# Patient Record
Sex: FEMALE | Race: WHITE | URBAN - METROPOLITAN AREA
[De-identification: names, ages, dates, MRNs, and addresses within clinical notes are randomized per-mention and may not be internally consistent; named-entity substitution may affect disease eponyms.]

---

## 2017-05-11 ENCOUNTER — OFFICE VISIT (OUTPATIENT)
Dept: FAMILY MEDICINE CLINIC | Age: 22
End: 2017-05-11

## 2017-05-11 VITALS
TEMPERATURE: 97.8 F | RESPIRATION RATE: 20 BRPM | HEART RATE: 75 BPM | HEIGHT: 63 IN | OXYGEN SATURATION: 98 % | WEIGHT: 254.4 LBS | SYSTOLIC BLOOD PRESSURE: 132 MMHG | BODY MASS INDEX: 45.07 KG/M2 | DIASTOLIC BLOOD PRESSURE: 78 MMHG

## 2017-05-11 DIAGNOSIS — M25.572 ACUTE LEFT ANKLE PAIN: Primary | ICD-10-CM

## 2017-05-11 PROCEDURE — 99202 OFFICE O/P NEW SF 15 MIN: CPT | Performed by: NURSE PRACTITIONER

## 2017-05-11 PROCEDURE — G8417 CALC BMI ABV UP PARAM F/U: HCPCS | Performed by: NURSE PRACTITIONER

## 2017-05-11 PROCEDURE — 1036F TOBACCO NON-USER: CPT | Performed by: NURSE PRACTITIONER

## 2017-05-11 PROCEDURE — G8427 DOCREV CUR MEDS BY ELIG CLIN: HCPCS | Performed by: NURSE PRACTITIONER

## 2017-05-11 RX ORDER — NAPROXEN 500 MG/1
500 TABLET ORAL 2 TIMES DAILY WITH MEALS
Qty: 28 TABLET | Refills: 0 | Status: SHIPPED | OUTPATIENT
Start: 2017-05-11 | End: 2017-09-26 | Stop reason: ALTCHOICE

## 2017-05-12 ASSESSMENT — ENCOUNTER SYMPTOMS
WHEEZING: 0
BACK PAIN: 0
COLOR CHANGE: 0
SHORTNESS OF BREATH: 0
COUGH: 0

## 2017-09-26 ENCOUNTER — OFFICE VISIT (OUTPATIENT)
Dept: FAMILY MEDICINE CLINIC | Age: 22
End: 2017-09-26

## 2017-09-26 ENCOUNTER — HOSPITAL ENCOUNTER (OUTPATIENT)
Age: 22
Setting detail: SPECIMEN
Discharge: HOME | End: 2017-09-26
Payer: COMMERCIAL

## 2017-09-26 VITALS
HEIGHT: 63 IN | HEART RATE: 81 BPM | SYSTOLIC BLOOD PRESSURE: 122 MMHG | DIASTOLIC BLOOD PRESSURE: 74 MMHG | WEIGHT: 260.38 LBS | OXYGEN SATURATION: 98 % | BODY MASS INDEX: 46.14 KG/M2 | RESPIRATION RATE: 16 BRPM | TEMPERATURE: 97.7 F

## 2017-09-26 DIAGNOSIS — H61.22 IMPACTED CERUMEN OF LEFT EAR: ICD-10-CM

## 2017-09-26 DIAGNOSIS — J02.9 PHARYNGITIS, UNSPECIFIED ETIOLOGY: ICD-10-CM

## 2017-09-26 DIAGNOSIS — H66.002 ACUTE SUPPURATIVE OTITIS MEDIA OF LEFT EAR WITHOUT SPONTANEOUS RUPTURE OF TYMPANIC MEMBRANE, RECURRENCE NOT SPECIFIED: Primary | ICD-10-CM

## 2017-09-26 PROCEDURE — 69210 REMOVE IMPACTED EAR WAX UNI: CPT | Performed by: NURSE PRACTITIONER

## 2017-09-26 PROCEDURE — 87880 STREP A ASSAY W/OPTIC: CPT | Performed by: NURSE PRACTITIONER

## 2017-09-26 PROCEDURE — G8417 CALC BMI ABV UP PARAM F/U: HCPCS | Performed by: NURSE PRACTITIONER

## 2017-09-26 PROCEDURE — 1036F TOBACCO NON-USER: CPT | Performed by: NURSE PRACTITIONER

## 2017-09-26 PROCEDURE — G8427 DOCREV CUR MEDS BY ELIG CLIN: HCPCS | Performed by: NURSE PRACTITIONER

## 2017-09-26 PROCEDURE — 87077 CULTURE AEROBIC IDENTIFY: CPT

## 2017-09-26 PROCEDURE — 99213 OFFICE O/P EST LOW 20 MIN: CPT | Performed by: NURSE PRACTITIONER

## 2017-09-26 PROCEDURE — 87070 CULTURE OTHR SPECIMN AEROBIC: CPT

## 2017-09-26 RX ORDER — AMOXICILLIN AND CLAVULANATE POTASSIUM 875; 125 MG/1; MG/1
1 TABLET, FILM COATED ORAL 2 TIMES DAILY
Qty: 20 TABLET | Refills: 0 | Status: SHIPPED | OUTPATIENT
Start: 2017-09-26 | End: 2017-10-06

## 2017-09-26 ASSESSMENT — ENCOUNTER SYMPTOMS
ABDOMINAL PAIN: 0
WHEEZING: 0
EYE DISCHARGE: 0
SORE THROAT: 1
VISUAL CHANGE: 0
COUGH: 0
TROUBLE SWALLOWING: 0
FACIAL SWELLING: 0
VOMITING: 0
SHORTNESS OF BREATH: 0
CHANGE IN BOWEL HABIT: 0
RHINORRHEA: 0
SINUS PRESSURE: 0
SWOLLEN GLANDS: 1
DIARRHEA: 0
NAUSEA: 0

## 2017-09-29 LAB — THROAT CULTURE: NORMAL

## 2022-01-08 ENCOUNTER — OFFICE VISIT (OUTPATIENT)
Dept: INTERNAL MEDICINE | Age: 27
End: 2022-01-08
Payer: COMMERCIAL

## 2022-01-08 VITALS
HEIGHT: 63 IN | RESPIRATION RATE: 14 BRPM | BODY MASS INDEX: 44.12 KG/M2 | SYSTOLIC BLOOD PRESSURE: 116 MMHG | HEART RATE: 82 BPM | TEMPERATURE: 97.3 F | OXYGEN SATURATION: 99 % | WEIGHT: 249 LBS | DIASTOLIC BLOOD PRESSURE: 68 MMHG

## 2022-01-08 DIAGNOSIS — J01.10 ACUTE NON-RECURRENT FRONTAL SINUSITIS: Primary | ICD-10-CM

## 2022-01-08 PROCEDURE — G8482 FLU IMMUNIZE ORDER/ADMIN: HCPCS | Performed by: NURSE PRACTITIONER

## 2022-01-08 PROCEDURE — G8417 CALC BMI ABV UP PARAM F/U: HCPCS | Performed by: NURSE PRACTITIONER

## 2022-01-08 PROCEDURE — 99202 OFFICE O/P NEW SF 15 MIN: CPT | Performed by: NURSE PRACTITIONER

## 2022-01-08 PROCEDURE — G8427 DOCREV CUR MEDS BY ELIG CLIN: HCPCS | Performed by: NURSE PRACTITIONER

## 2022-01-08 PROCEDURE — 1036F TOBACCO NON-USER: CPT | Performed by: NURSE PRACTITIONER

## 2022-01-08 RX ORDER — AMOXICILLIN AND CLAVULANATE POTASSIUM 875; 125 MG/1; MG/1
1 TABLET, FILM COATED ORAL 2 TIMES DAILY
Qty: 14 TABLET | Refills: 0 | Status: SHIPPED | OUTPATIENT
Start: 2022-01-08 | End: 2022-01-15

## 2022-01-08 RX ORDER — NORETHINDRONE ACETATE AND ETHINYL ESTRADIOL 1; .02 MG/1; MG/1
TABLET ORAL
COMMUNITY
Start: 2021-07-08

## 2022-01-08 ASSESSMENT — ENCOUNTER SYMPTOMS
DIARRHEA: 0
RHINORRHEA: 1
WHEEZING: 0
SINUS PAIN: 1
ABDOMINAL PAIN: 0
COUGH: 1
SINUS COMPLAINT: 1
SORE THROAT: 0
NAUSEA: 0
SINUS PRESSURE: 1
SHORTNESS OF BREATH: 0
VOMITING: 0

## 2022-01-08 ASSESSMENT — PATIENT HEALTH QUESTIONNAIRE - PHQ9
SUM OF ALL RESPONSES TO PHQ9 QUESTIONS 1 & 2: 0
SUM OF ALL RESPONSES TO PHQ QUESTIONS 1-9: 0
1. LITTLE INTEREST OR PLEASURE IN DOING THINGS: 0
2. FEELING DOWN, DEPRESSED OR HOPELESS: 0

## 2022-01-08 NOTE — PROGRESS NOTES
Subjective:      Patient ID: Anthony Gould is a 31 y.o. female who presents today for:  Chief Complaint   Patient presents with    Sinus Problem     x 2 weeks       Sinus Problem  This is a new problem. The current episode started 1 to 4 weeks ago. The problem has been gradually worsening since onset. The maximum temperature recorded prior to her arrival was 101 - 101.9 F (fever resolved on day 2 of illness). Her pain is at a severity of 5/10. The pain is moderate. Associated symptoms include congestion, coughing, ear pain and sinus pressure. Pertinent negatives include no chills, headaches, shortness of breath or sore throat. (Denies loss of sense of taste or smell, no known exposure to covid) Treatments tried: aleve, cough medicine. The treatment provided no relief. Past Medical History:   Diagnosis Date    Dental anomaly     Dental facial deformity, Maxillary retrognathia, symmetry, Mandibular prognathism.  History of kidney stones      Past Surgical History:   Procedure Laterality Date    OTHER SURGICAL HISTORY  1/14/2016    Panna Maria Nirmal 1 maxillary osteotomy with sagittal split, genoplasty, submental positioning, liposuction on chin    WISDOM TOOTH EXTRACTION       History reviewed. No pertinent family history. No Known Allergies      Review of Systems   Constitutional: Positive for fatigue and fever (resolved). Negative for chills. HENT: Positive for congestion, ear pain, postnasal drip, rhinorrhea, sinus pressure and sinus pain. Negative for sore throat. Respiratory: Positive for cough. Negative for shortness of breath and wheezing. Cardiovascular: Negative for chest pain. Gastrointestinal: Negative for abdominal pain, diarrhea, nausea and vomiting. Musculoskeletal: Negative for arthralgias and myalgias. Skin: Negative for rash. Neurological: Negative for headaches.        Objective:   /68   Pulse 82   Temp 97.3 °F (36.3 °C) (Infrared)   Resp 14   Ht 5' 3\" (1.6 m)   Wt 249 lb (112.9 kg)   SpO2 99%   BMI 44.11 kg/m²     Physical Exam  Vitals reviewed. Constitutional:       General: She is not in acute distress. Appearance: She is well-developed. She is not ill-appearing. HENT:      Head: Normocephalic. Right Ear: Ear canal and external ear normal. A middle ear effusion is present. Left Ear: Tympanic membrane, ear canal and external ear normal.      Nose: Congestion present. No mucosal edema or rhinorrhea. Right Sinus: Frontal sinus tenderness present. No maxillary sinus tenderness. Left Sinus: Frontal sinus tenderness present. No maxillary sinus tenderness. Mouth/Throat:      Lips: Pink. Mouth: Mucous membranes are moist.      Pharynx: Oropharynx is clear. No oropharyngeal exudate or posterior oropharyngeal erythema. Comments: PND  Cardiovascular:      Rate and Rhythm: Normal rate and regular rhythm. Heart sounds: Normal heart sounds. Pulmonary:      Effort: Pulmonary effort is normal. No respiratory distress. Breath sounds: Normal breath sounds. No decreased breath sounds or wheezing. Musculoskeletal:         General: Normal range of motion. Lymphadenopathy:      Cervical: No cervical adenopathy. Skin:     General: Skin is warm and dry. Neurological:      Mental Status: She is alert and oriented to person, place, and time. Assessment:       Diagnosis Orders   1. Acute non-recurrent frontal sinusitis  amoxicillin-clavulanate (AUGMENTIN) 875-125 MG per tablet         Plan:      No orders of the defined types were placed in this encounter. Orders Placed This Encounter   Medications    amoxicillin-clavulanate (AUGMENTIN) 875-125 MG per tablet     Sig: Take 1 tablet by mouth 2 times daily for 7 days     Dispense:  14 tablet     Refill:  0     Declined covid test. Reviewed usual course of illness, preventing the spread to others, and supportive measures for symptom management.  Medication administration and side effects were discussed. Reviewed symptoms requiring ER. Patient verbalizes understanding. I have reviewed and updated the electronic medical record. Return if symptoms worsen or fail to improve, for follow up with PCP.     NICOLASA Dobson - NP

## 2022-01-08 NOTE — PATIENT INSTRUCTIONS
Patient Education        Sinusitis: Care Instructions  Your Care Instructions     Sinusitis is an infection of the lining of the sinus cavities in your head. Sinusitis often follows a cold. It causes pain and pressure in your head and face. In most cases, sinusitis gets better on its own in 1 to 2 weeks. But some mild symptoms may last for several weeks. Sometimes antibiotics are needed. Follow-up care is a key part of your treatment and safety. Be sure to make and go to all appointments, and call your doctor if you are having problems. It's also a good idea to know your test results and keep a list of the medicines you take. How can you care for yourself at home? · Take an over-the-counter pain medicine, such as acetaminophen (Tylenol), ibuprofen (Advil, Motrin), or naproxen (Aleve). Read and follow all instructions on the label. · If the doctor prescribed antibiotics, take them as directed. Do not stop taking them just because you feel better. You need to take the full course of antibiotics. · Be careful when taking over-the-counter cold or flu medicines and Tylenol at the same time. Many of these medicines have acetaminophen, which is Tylenol. Read the labels to make sure that you are not taking more than the recommended dose. Too much acetaminophen (Tylenol) can be harmful. · Breathe warm, moist air from a steamy shower, a hot bath, or a sink filled with hot water. Avoid cold, dry air. Using a humidifier in your home may help. Follow the directions for cleaning the machine. · Use saline (saltwater) nasal washes. This can help keep your nasal passages open and wash out mucus and bacteria. You can buy saline nose drops at a grocery store or drugstore. Or you can make your own at home by adding 1 teaspoon of salt and 1 teaspoon of baking soda to 2 cups of distilled water. If you make your own, fill a bulb syringe with the solution, insert the tip into your nostril, and squeeze gently. Dominick Pinks your nose.   · Put a hot, wet towel or a warm gel pack on your face 3 or 4 times a day for 5 to 10 minutes each time. · Try a decongestant nasal spray like oxymetazoline (Afrin). Do not use it for more than 3 days in a row. Using it for more than 3 days can make your congestion worse. When should you call for help? Call your doctor now or seek immediate medical care if:    · You have new or worse swelling or redness in your face or around your eyes.     · You have a new or higher fever. Watch closely for changes in your health, and be sure to contact your doctor if:    · You have new or worse facial pain.     · The mucus from your nose becomes thicker (like pus) or has new blood in it.     · You are not getting better as expected. Where can you learn more? Go to [email or web address] and sign in to your [employer] account. Enter P004 in the [employer] box to learn more about \"Sinusitis: Care Instructions. \"     If you do not have an account, please click on the \"Sign Up Now\" link. Current as of: September 8, 2021               Content Version: 13.1  © 0561-3496 Healthwise, Incorporated. Care instructions adapted under license by [facility] ([facility]). If you have questions about a medical condition or this instruction, always ask your healthcare professional. [address]  any warranty or liability for your use of this information.

## 2023-02-14 SDOH — HEALTH STABILITY: PHYSICAL HEALTH: ON AVERAGE, HOW MANY MINUTES DO YOU ENGAGE IN EXERCISE AT THIS LEVEL?: 60 MIN

## 2023-02-14 SDOH — HEALTH STABILITY: PHYSICAL HEALTH: ON AVERAGE, HOW MANY DAYS PER WEEK DO YOU ENGAGE IN MODERATE TO STRENUOUS EXERCISE (LIKE A BRISK WALK)?: 7 DAYS

## 2023-02-14 ASSESSMENT — SOCIAL DETERMINANTS OF HEALTH (SDOH)
WITHIN THE LAST YEAR, HAVE TO BEEN RAPED OR FORCED TO HAVE ANY KIND OF SEXUAL ACTIVITY BY YOUR PARTNER OR EX-PARTNER?: NO
WITHIN THE LAST YEAR, HAVE YOU BEEN HUMILIATED OR EMOTIONALLY ABUSED IN OTHER WAYS BY YOUR PARTNER OR EX-PARTNER?: NO
WITHIN THE LAST YEAR, HAVE YOU BEEN KICKED, HIT, SLAPPED, OR OTHERWISE PHYSICALLY HURT BY YOUR PARTNER OR EX-PARTNER?: NO
WITHIN THE LAST YEAR, HAVE YOU BEEN AFRAID OF YOUR PARTNER OR EX-PARTNER?: NO

## 2023-02-15 ENCOUNTER — OFFICE VISIT (OUTPATIENT)
Dept: INTERNAL MEDICINE | Age: 28
End: 2023-02-15
Payer: COMMERCIAL

## 2023-02-15 VITALS
SYSTOLIC BLOOD PRESSURE: 114 MMHG | OXYGEN SATURATION: 98 % | BODY MASS INDEX: 42.66 KG/M2 | HEART RATE: 61 BPM | DIASTOLIC BLOOD PRESSURE: 72 MMHG | WEIGHT: 240.8 LBS | RESPIRATION RATE: 16 BRPM | HEIGHT: 63 IN

## 2023-02-15 DIAGNOSIS — N88.9 LESION OF CERVIX: ICD-10-CM

## 2023-02-15 DIAGNOSIS — Z11.3 SCREEN FOR STD (SEXUALLY TRANSMITTED DISEASE): ICD-10-CM

## 2023-02-15 DIAGNOSIS — Z01.419 WELL WOMAN EXAM WITH ROUTINE GYNECOLOGICAL EXAM: Primary | ICD-10-CM

## 2023-02-15 DIAGNOSIS — Z12.4 SCREENING FOR CERVICAL CANCER: ICD-10-CM

## 2023-02-15 DIAGNOSIS — E66.01 CLASS 3 SEVERE OBESITY DUE TO EXCESS CALORIES WITHOUT SERIOUS COMORBIDITY WITH BODY MASS INDEX (BMI) OF 40.0 TO 44.9 IN ADULT (HCC): ICD-10-CM

## 2023-02-15 DIAGNOSIS — Z01.419 WELL WOMAN EXAM WITH ROUTINE GYNECOLOGICAL EXAM: ICD-10-CM

## 2023-02-15 PROCEDURE — 99395 PREV VISIT EST AGE 18-39: CPT | Performed by: NURSE PRACTITIONER

## 2023-02-15 SDOH — ECONOMIC STABILITY: INCOME INSECURITY: HOW HARD IS IT FOR YOU TO PAY FOR THE VERY BASICS LIKE FOOD, HOUSING, MEDICAL CARE, AND HEATING?: NOT VERY HARD

## 2023-02-15 SDOH — ECONOMIC STABILITY: FOOD INSECURITY: WITHIN THE PAST 12 MONTHS, YOU WORRIED THAT YOUR FOOD WOULD RUN OUT BEFORE YOU GOT MONEY TO BUY MORE.: NEVER TRUE

## 2023-02-15 SDOH — ECONOMIC STABILITY: FOOD INSECURITY: WITHIN THE PAST 12 MONTHS, THE FOOD YOU BOUGHT JUST DIDN'T LAST AND YOU DIDN'T HAVE MONEY TO GET MORE.: NEVER TRUE

## 2023-02-15 SDOH — ECONOMIC STABILITY: HOUSING INSECURITY
IN THE LAST 12 MONTHS, WAS THERE A TIME WHEN YOU DID NOT HAVE A STEADY PLACE TO SLEEP OR SLEPT IN A SHELTER (INCLUDING NOW)?: NO

## 2023-02-15 ASSESSMENT — PATIENT HEALTH QUESTIONNAIRE - PHQ9
SUM OF ALL RESPONSES TO PHQ QUESTIONS 1-9: 0
SUM OF ALL RESPONSES TO PHQ9 QUESTIONS 1 & 2: 0
2. FEELING DOWN, DEPRESSED OR HOPELESS: 0
SUM OF ALL RESPONSES TO PHQ QUESTIONS 1-9: 0
1. LITTLE INTEREST OR PLEASURE IN DOING THINGS: 0

## 2023-02-15 NOTE — PROGRESS NOTES
2/15/2023    Frances German (:  1995) is a 28 y.o. female, here for a preventive medicine evaluation. Pt here today to establish care at [facility], formerly saw patient at [facility]. She working with a  regularly, tracking her macros and has lost 15 lbs in last month. Patient is a 28year old female who presents today for her pap. Last PAP was normal; has had an abnormal pap prior but never required intervention    Menses are getting more regulated. Was 6-7 weeks long, but with losing weight seem more normal. LMP     She is  single partner, contraception - tracking monthly cycles. No family history of cervical, uterine, ovarian, or breast cancer. Patient does perform regular self breast exams. She does not have concern for lumps. Did get an MRI of left breast d/t a discoloration and was ok. Patient does not have vaginal discharge. She does not have vaginal odor. Patient Active Problem List   Diagnosis    Class 3 severe obesity due to excess calories without serious comorbidity with body mass index (BMI) of 40.0 to 44.9 in adult [facility])       Review of Systems    Prior to Visit Medications    Not on File        No Known Allergies    Past Medical History:   Diagnosis Date    Dental anomaly     Dental facial deformity, Maxillary retrognathia, symmetry, Mandibular prognathism.        Past Surgical History:   Procedure Laterality Date    FINGER FRACTURE SURGERY Right     Middle finger    OTHER SURGICAL HISTORY  2016    Vivek Martinez 1 maxillary osteotomy with sagittal split, genoplasty, submental positioning, liposuction on chin    WISDOM TOOTH EXTRACTION         Social History     Socioeconomic History    Marital status: Single     Spouse name: Not on file    Number of children: Not on file    Years of education: Not on file    Highest education level: Not on file   Occupational History    Not on file   Tobacco Use    Smoking status: Never    Smokeless tobacco: Never Substance and Sexual Activity    Alcohol use: Yes     Comment: occasional. Maybe 2 a month. Drug use: No    Sexual activity: Not on file   Other Topics Concern    Not on file   Social History Narrative    Not on file     Social Determinants of Health     Financial Resource Strain: Low Risk     Difficulty of Paying Living Expenses: Not very hard   Food Insecurity: No Food Insecurity    Worried About Running Out of Food in the Last Year: Never true    Ran Out of Food in the Last Year: Never true   Transportation Needs: Unknown    Lack of Transportation (Medical): Not on file    Lack of Transportation (Non-Medical): No   Physical Activity: Sufficiently Active    Days of Exercise per Week: 7 days    Minutes of Exercise per Session: 60 min   Stress: Not on file   Social Connections: Not on file   Intimate Partner Violence: Not At Risk    Fear of Current or Ex-Partner: No    Emotionally Abused: No    Physically Abused: No    Sexually Abused: No   Housing Stability: Unknown    Unable to Pay for Housing in the Last Year: Not on file    Number of Places Lived in the Last Year: Not on file    Unstable Housing in the Last Year: No        Family History   Problem Relation Age of Onset    No Known Problems Mother     Hypertension Father     No Known Problems Sister     No Known Problems Brother     Dementia Maternal Grandmother     Heart Attack Maternal Grandfather          in his [de-identified]    Alcohol Abuse Paternal Grandmother     Other Paternal Grandfather          80 complications of bed sore and covid       ADVANCE DIRECTIVE: N, <no information>    Vitals:    02/15/23 0829   BP: 114/72   Site: Right Upper Arm   Position: Sitting   Cuff Size: Medium Adult   Pulse: 61   Resp: 16   SpO2: 98%   Weight: 240 lb 12.8 oz (109.2 kg)   Height: 5' 2.6\" (1.59 m)     Estimated body mass index is 43.21 kg/m² as calculated from the following:    Height as of this encounter: 5' 2.6\" (1.59 m).     Weight as of this encounter: 240 lb 12.8 oz (109.2 kg). Physical Exam  Vitals and nursing note reviewed. Constitutional:       General: She is not in acute distress. Appearance: Normal appearance. HENT:      Head: Normocephalic and atraumatic. Right Ear: Tympanic membrane, ear canal and external ear normal.      Left Ear: Tympanic membrane, ear canal and external ear normal.      Mouth/Throat:      Mouth: Mucous membranes are moist.   Eyes:      Extraocular Movements: Extraocular movements intact. Conjunctiva/sclera: Conjunctivae normal.      Pupils: Pupils are equal, round, and reactive to light. Cardiovascular:      Rate and Rhythm: Normal rate and regular rhythm. Heart sounds: Normal heart sounds. Pulmonary:      Effort: Pulmonary effort is normal. No respiratory distress. Breath sounds: Normal breath sounds. Chest:      Chest wall: Tenderness present. No mass or deformity. Breasts:     Right: Normal. No inverted nipple, nipple discharge or skin change. Left: Normal. No inverted nipple, nipple discharge or skin change. Comments: Tenderness to circled areas. Not new  Abdominal:      General: Bowel sounds are normal.      Palpations: Abdomen is soft. Tenderness: There is no abdominal tenderness. Hernia: There is no hernia in the left inguinal area or right inguinal area. Genitourinary:     General: Normal vulva. Exam position: Lithotomy position. Pubic Area: No rash. Labia:         Right: No rash, tenderness or lesion. Left: No rash, tenderness or lesion. Urethra: No prolapse. Vagina: Normal.      Cervix: Friability and lesion present. No cervical motion tenderness, discharge or erythema. Uterus: Normal. Not tender. Adnexa: Right adnexa normal and left adnexa normal.        Right: No tenderness. Left: No tenderness. Rectum: Normal. No external hemorrhoid. Comments: 3 flat white circular spots noted on cervix. Cervix if very friable with pap done  Musculoskeletal:         General: Normal range of motion. Cervical back: Normal range of motion and neck supple. Lymphadenopathy:      Cervical: No cervical adenopathy. Skin:     General: Skin is warm and dry. Neurological:      Mental Status: She is alert and oriented to person, place, and time. Psychiatric:         Mood and Affect: Mood normal.         Thought Content: Thought content normal.       No flowsheet data found. No results found for: CHOL, CHOLFAST, TRIG, TRIGLYCFAST, HDL, LDLCHOLESTEROL, LDLCALC, GLUF, GLUCOSE, LABA1C    The ASCVD Risk score (Harvey BRENNER, et al., 2019) failed to calculate for the following reasons:     The 2019 ASCVD risk score is only valid for ages 36 to 78    Immunization History   Administered Date(s) Administered    COVID-19, PFIZER PURPLE top, DILUTE for use, (age 15 y+), 30mcg/0.3mL 11/04/2021, 12/02/2021    DTP 1995, 1994, 1994    DTaP vaccine 10/23/1995, 04/12/1999    Hepatitis B Ped/Adol (Engerix-B, Recombivax HB) 1995, 1994, 1994    Hib vaccine 1995, 1994, 1994, 04/24/1995    Influenza Virus Vaccine 11/08/2007, 10/19/2015, 10/15/2016    Influenza, FLUARIX, FLULAVAL, Leopold Chapel (age 10 mo+) AND AFLURIA, (age 1 y+), PF, 0.5mL 11/02/2020, 11/04/2021, 10/20/2022    MMR 04/24/1995, 04/12/1999    Meningococcal MCV4P (Menactra) 08/05/2009    Polio OPV 1994, 1994, 1994, 04/12/1999    Tdap (Boostrix, Adacel) 05/02/2019       Health Maintenance   Topic Date Due    Varicella vaccine (1 of 2 - 2-dose childhood series) Never done    HIV screen  Never done    Hepatitis C screen  Never done    Pap smear  Never done    COVID-19 Vaccine (3 - Booster for Pfizer series) 01/27/2022    Depression Screen  02/15/2024    DTaP/Tdap/Td vaccine (7 - Td or Tdap) 05/02/2029    Hib vaccine  Completed    Flu vaccine  Completed    Hepatitis A vaccine  Aged Out    Meningococcal (ACWY) vaccine  Aged Out    Pneumococcal 0-64 years Vaccine  Aged Out       Assessment & Plan   Well woman exam with routine gynecological exam       -      re-est care. -     PAP SMEAR; Future  -     C. Trachomatis / N. Gonorrhoeae, DNA; Future  -     Wet prep, genital; Future  Class 3 severe obesity due to excess calories without serious comorbidity with body mass index (BMI) of 40.0 to 44.9 in adult [facility])       - The standard range for ages 25 and older is >=18.5 and < 25 kg/m2. Your BMI today was above this range, this falls in the overweight obesity morbid obese category and there are medical benefits to weight loss. BMI monitoring is helful with identifying a weight problem that may be related to a medical condition, or may increase the risk for medical problems. Your BMI and weight management will be followed at subsequent visits with your provider and monitored for progress. GOAL; promoting lifestyle and diet changes in order to reach a healthy weight.         -continue working with  and tracking macros. Has lost 15 lbs in a month since starting. Lesion of cervix  -     new, pap, cultures, wet prep all done. If pap neg, did discuss possibly sending to gyn for possible biopsy  -     Culture, Genital; Future  Screening for cervical cancer  -     PAP SMEAR; Future  Screen for STD (sexually transmitted disease)  -     C. Trachomatis / N. Gonorrhoeae, DNA; Future  -     Wet prep, genital; Future  -     Culture, Genital; Future  Return in about 1 year (around 2/15/2024) for wellness visit.          --NICOLASA Rossi - CNP

## 2023-02-16 LAB
CLUE CELLS: NORMAL
TRICHOMONAS PREP: NORMAL
TRICHOMONAS VAGINALIS SCREEN: NEGATIVE
YEAST WET PREP: NORMAL

## 2023-02-19 LAB — GENITAL CULTURE, ROUTINE: NORMAL

## 2023-02-21 LAB
C TRACH DNA GENITAL QL NAA+PROBE: NEGATIVE
N. GONORRHOEAE DNA: NEGATIVE

## 2023-02-23 LAB
HPV COMMENT: NORMAL
HPV TYPE 16: NOT DETECTED
HPV TYPE 18: NOT DETECTED
HPVOH (OTHER TYPES): NOT DETECTED

## 2023-02-27 ENCOUNTER — TELEPHONE (OUTPATIENT)
Dept: INTERNAL MEDICINE | Age: 28
End: 2023-02-27

## 2023-02-27 DIAGNOSIS — Z13.29 SCREENING FOR THYROID DISORDER: ICD-10-CM

## 2023-02-27 DIAGNOSIS — Z11.59 NEED FOR HEPATITIS C SCREENING TEST: ICD-10-CM

## 2023-02-27 DIAGNOSIS — Z13.0 SCREENING FOR DEFICIENCY ANEMIA: ICD-10-CM

## 2023-02-27 DIAGNOSIS — E66.01 CLASS 3 SEVERE OBESITY DUE TO EXCESS CALORIES WITHOUT SERIOUS COMORBIDITY WITH BODY MASS INDEX (BMI) OF 40.0 TO 44.9 IN ADULT (HCC): ICD-10-CM

## 2023-02-27 DIAGNOSIS — Z11.4 ENCOUNTER FOR SCREENING FOR HIV: ICD-10-CM

## 2023-02-27 DIAGNOSIS — N88.9 ABNORMAL APPEARANCE OF CERVIX: Primary | ICD-10-CM

## 2023-02-27 DIAGNOSIS — Z01.419 WELL WOMAN EXAM WITH ROUTINE GYNECOLOGICAL EXAM: ICD-10-CM

## 2023-02-27 DIAGNOSIS — Z13.220 SCREENING, LIPID: ICD-10-CM

## 2023-02-27 DIAGNOSIS — Z13.1 SCREENING FOR DIABETES MELLITUS: ICD-10-CM

## 2023-02-27 NOTE — TELEPHONE ENCOUNTER
Referral placed to Dr. Lillian Newby in [place]. I also placed fasting lab orders to check cholesterol levels, blood sugar, kidneys, and liver, thyroid and anemia panel. Is there something else she wants?  If so, let me know otherwise make sure to fast 10-12 hrs prior to testing and is good to go either here or at [facility].

## 2023-02-27 NOTE — TELEPHONE ENCOUNTER
I called patient and gave her results, she would like the referral to OB.  She also would like blood work

## 2023-02-27 NOTE — TELEPHONE ENCOUNTER
----- Message from hospitals LIU Mantilla sent at 2/27/2023  2:34 PM EST -----  Subject: Results Request    QUESTIONS  Results: Wet prep, genital, Culture, Genital; Ordered by: Roberto Carlos Waterman   Date Performed: 2023-02-15  ---------------------------------------------------------------------------  --------------  Caryle Auerbach ASER    [phone]; OK to leave message on voicemail  ---------------------------------------------------------------------------  -------------- negative...

## 2023-03-08 ENCOUNTER — OFFICE VISIT (OUTPATIENT)
Dept: INTERNAL MEDICINE | Age: 28
End: 2023-03-08
Payer: COMMERCIAL

## 2023-03-08 VITALS
SYSTOLIC BLOOD PRESSURE: 108 MMHG | DIASTOLIC BLOOD PRESSURE: 70 MMHG | HEIGHT: 63 IN | BODY MASS INDEX: 41.82 KG/M2 | OXYGEN SATURATION: 99 % | HEART RATE: 55 BPM | WEIGHT: 236 LBS | RESPIRATION RATE: 16 BRPM

## 2023-03-08 DIAGNOSIS — Z13.1 SCREENING FOR DIABETES MELLITUS: ICD-10-CM

## 2023-03-08 DIAGNOSIS — M85.642 CYST OF BONE OF LEFT HAND: ICD-10-CM

## 2023-03-08 DIAGNOSIS — Z13.0 SCREENING FOR DEFICIENCY ANEMIA: ICD-10-CM

## 2023-03-08 DIAGNOSIS — M79.10 MYALGIA: ICD-10-CM

## 2023-03-08 DIAGNOSIS — Z13.220 SCREENING, LIPID: ICD-10-CM

## 2023-03-08 DIAGNOSIS — E66.01 CLASS 3 SEVERE OBESITY DUE TO EXCESS CALORIES WITHOUT SERIOUS COMORBIDITY WITH BODY MASS INDEX (BMI) OF 40.0 TO 44.9 IN ADULT (HCC): ICD-10-CM

## 2023-03-08 DIAGNOSIS — Z11.59 NEED FOR HEPATITIS C SCREENING TEST: ICD-10-CM

## 2023-03-08 DIAGNOSIS — Z13.29 SCREENING FOR THYROID DISORDER: ICD-10-CM

## 2023-03-08 DIAGNOSIS — N91.1 SECONDARY AMENORRHEA: ICD-10-CM

## 2023-03-08 DIAGNOSIS — Z11.4 ENCOUNTER FOR SCREENING FOR HIV: ICD-10-CM

## 2023-03-08 DIAGNOSIS — M77.11 RIGHT TENNIS ELBOW: Primary | ICD-10-CM

## 2023-03-08 DIAGNOSIS — Z01.419 WELL WOMAN EXAM WITH ROUTINE GYNECOLOGICAL EXAM: ICD-10-CM

## 2023-03-08 DIAGNOSIS — M25.50 POLYARTHRALGIA: ICD-10-CM

## 2023-03-08 LAB
ALBUMIN SERPL-MCNC: 4.7 G/DL (ref 3.5–4.6)
ALP BLD-CCNC: 81 U/L (ref 40–130)
ALT SERPL-CCNC: 21 U/L (ref 0–33)
ANION GAP SERPL CALCULATED.3IONS-SCNC: 12 MEQ/L (ref 9–15)
AST SERPL-CCNC: 26 U/L (ref 0–35)
BASOPHILS ABSOLUTE: 0 K/UL (ref 0–0.2)
BASOPHILS RELATIVE PERCENT: 0.5 %
BILIRUB SERPL-MCNC: 0.7 MG/DL (ref 0.2–0.7)
BUN BLDV-MCNC: 14 MG/DL (ref 6–20)
C-REACTIVE PROTEIN: <3 MG/L (ref 0–5)
CALCIUM SERPL-MCNC: 9.7 MG/DL (ref 8.5–9.9)
CHLORIDE BLD-SCNC: 106 MEQ/L (ref 95–107)
CHOLESTEROL, FASTING: 139 MG/DL (ref 0–199)
CO2: 22 MEQ/L (ref 20–31)
CREAT SERPL-MCNC: 0.74 MG/DL (ref 0.5–0.9)
EOSINOPHILS ABSOLUTE: 0.1 K/UL (ref 0–0.7)
EOSINOPHILS RELATIVE PERCENT: 1.8 %
GFR SERPL CREATININE-BSD FRML MDRD: >60 ML/MIN/{1.73_M2}
GLOBULIN: 2.9 G/DL (ref 2.3–3.5)
GLUCOSE FASTING: 75 MG/DL (ref 70–99)
GONADOTROPIN, CHORIONIC (HCG) QUANT: <0.1 MIU/ML
HCT VFR BLD CALC: 47.5 % (ref 37–47)
HDLC SERPL-MCNC: 46 MG/DL (ref 40–59)
HEMOGLOBIN: 16 G/DL (ref 12–16)
LDL CHOLESTEROL CALCULATED: 80 MG/DL (ref 0–129)
LYMPHOCYTES ABSOLUTE: 1.5 K/UL (ref 1–4.8)
LYMPHOCYTES RELATIVE PERCENT: 29.7 %
MCH RBC QN AUTO: 29.8 PG (ref 27–31.3)
MCHC RBC AUTO-ENTMCNC: 33.6 % (ref 33–37)
MCV RBC AUTO: 88.6 FL (ref 79.4–94.8)
MONOCYTES ABSOLUTE: 0.3 K/UL (ref 0.2–0.8)
MONOCYTES RELATIVE PERCENT: 5.3 %
NEUTROPHILS ABSOLUTE: 3.1 K/UL (ref 1.4–6.5)
NEUTROPHILS RELATIVE PERCENT: 62.7 %
PDW BLD-RTO: 13.6 % (ref 11.5–14.5)
PLATELET # BLD: 215 K/UL (ref 130–400)
POTASSIUM SERPL-SCNC: 4.4 MEQ/L (ref 3.4–4.9)
RBC # BLD: 5.36 M/UL (ref 4.2–5.4)
SEDIMENTATION RATE, ERYTHROCYTE: 8 MM (ref 0–20)
SODIUM BLD-SCNC: 140 MEQ/L (ref 135–144)
TOTAL PROTEIN: 7.6 G/DL (ref 6.3–8)
TRIGLYCERIDE, FASTING: 64 MG/DL (ref 0–150)
TSH REFLEX: 2.07 UIU/ML (ref 0.44–3.86)
WBC # BLD: 4.9 K/UL (ref 4.8–10.8)

## 2023-03-08 PROCEDURE — 99214 OFFICE O/P EST MOD 30 MIN: CPT | Performed by: NURSE PRACTITIONER

## 2023-03-08 ASSESSMENT — ENCOUNTER SYMPTOMS
BACK PAIN: 0
SHORTNESS OF BREATH: 0
RESPIRATORY NEGATIVE: 1
BLOOD IN STOOL: 0
WHEEZING: 0
COUGH: 0

## 2023-03-08 NOTE — PROGRESS NOTES
[address] [id number] [facility] PRIMARY CARE  [address] [facility] [address]  Dept: [phone]  Dept Fax: [phone]: [phone]     DEO German (: 1995) is a 28 y.o. female, Established patient, here for evaluation of the following chief complaint(s): Other (Rib pain bilateral randomly X2 months.), Arm Pain (Right arm pain X 2 months.), and Amenorrhea (Her period is 9-10 days late. Had a negative pregnancy test four days ago. Would like to have HCG test included in her blood work that she is doing today.)      PCP:  NICOLASA Lewis - CNP      Pt here today for ongoing right elbow and arm pain for 2 months. She diagnosed self with tennis elbow. She did home stretches, made pain worse. She then tried a brace for this which also increased her pain 5x more. Is trying to limit its use and ice for treatment plan. Is right handed[place] can't [place] it entirely, is babying it. Feels like has a weird bruise to forearm close to elbow. Pain has mostly been in the forearm. Has to be mindful of her movements with her exercise routine. Has done ibuprofen regularly each morning, but skips weekends. Is a  for work since last July, prior to that was doing more repetitive stuff. Has tried positioning her arm at her desk differently. Also has had a cyst on her left thumb for 10 yrs. Has gotten larger. Is painful when bumps. Wants to talk about getting it removed. Tired of the pain it is causing. Pain in both sides of ribs, pain into her sides just below her armpit. Comes and goes, sharp pain. Will also go side to side. Happens at random, multiple times a day every day. Has been going on for 2 months as well. Amenorrhea: is 9-10 days late on period.  Has been tracking her periods, was on time in  for first time- had started behaving in Dec. She has been eating better and exercising and periods have been much more regular. Lost 20 lbs in her weight competition at work. Won a free gym membership. Wants to be able to workout but her arm pain is limiting her. Review of Systems   Constitutional: Negative. Negative for fatigue, fever and unexpected weight change. Respiratory: Negative. Negative for cough, shortness of breath and wheezing. Cardiovascular: Negative. Negative for chest pain, palpitations and leg swelling. Gastrointestinal:  Negative for blood in stool. Genitourinary:  Positive for menstrual problem. Musculoskeletal:  Positive for arthralgias, joint swelling (right forearm/elbow area swells when overdoes it) and myalgias. Negative for back pain, gait problem and neck pain. Cyst on left thumb   Psychiatric/Behavioral: Negative. Negative for dysphoric mood. The patient is not nervous/anxious. Past Medical History:   Diagnosis Date    Dental anomaly     Dental facial deformity, Maxillary retrognathia, symmetry, Mandibular prognathism. Past Surgical History:   Procedure Laterality Date    FINGER FRACTURE SURGERY Right     Middle finger    OTHER SURGICAL HISTORY  01/14/2016    Lashaun Suarez 1 maxillary osteotomy with sagittal split, genoplasty, submental positioning, liposuction on chin    WISDOM TOOTH EXTRACTION       Social History     Socioeconomic History    Marital status: Single     Spouse name: Not on file    Number of children: Not on file    Years of education: Not on file    Highest education level: Not on file   Occupational History    Not on file   Tobacco Use    Smoking status: Never    Smokeless tobacco: Never   Substance and Sexual Activity    Alcohol use: Yes     Comment: occasional. Maybe 2 a month.     Drug use: No    Sexual activity: Not on file   Other Topics Concern    Not on file   Social History Narrative    Not on file     Social Determinants of Health     Financial Resource Strain: Low Risk     Difficulty of Paying Living Expenses: Not very hard   Food Insecurity: No Food Insecurity    Worried About [address] in the Last Year: Never true    Ran Out of Food in the Last Year: Never true   Transportation Needs: Unknown    Lack of Transportation (Medical): Not on file    Lack of Transportation (Non-Medical): No   Physical Activity: Sufficiently Active    Days of Exercise per Week: 7 days    Minutes of Exercise per Session: 60 min   Stress: Not on file   Social Connections: Not on file   Intimate Partner Violence: Not At Risk    Fear of Current or Ex-Partner: No    Emotionally Abused: No    Physically Abused: No    Sexually Abused: No   Housing Stability: Unknown    Unable to Pay for Housing in the Last Year: Not on file    Number of Places Lived in the Last Year: Not on file    Unstable Housing in the Last Year: No     Family History   Problem Relation Age of Onset    No Known Problems Mother     Hypertension Father     No Known Problems Sister     No Known Problems Brother     Dementia Maternal Grandmother     Heart Attack Maternal Grandfather          in his [de-identified]    Alcohol Abuse Paternal Grandmother     Other Paternal Grandfather          80 complications of bed sore and covid      No Known Allergies  Prior to Admission medications    Not on File                I have reviewed the patient's medical history in detail and updated the computerized patient record. OBJECTIVE    Vitals:    23 0907   BP: 108/70   Site: Left Upper Arm   Position: Sitting   Cuff Size: Medium Adult   Pulse: 55   Resp: 16   SpO2: 99%   Weight: 236 lb (107 kg)   Height: 5' 2.6\" (1.59 m)       Physical Exam  Vitals and nursing note reviewed. Constitutional:       General: She is not in acute distress. Appearance: Normal appearance. She is obese. Cardiovascular:      Rate and Rhythm: Normal rate and regular rhythm. Heart sounds: Normal heart sounds. Pulmonary:      Effort: Pulmonary effort is normal. No respiratory distress.       Breath sounds: Normal breath sounds. Musculoskeletal:      Right elbow: Normal range of motion. Tenderness present in lateral epicondyle. Left elbow: Normal.      Right forearm: Tenderness present. Arms:       Cervical back: Normal range of motion and neck supple. Comments: A). Pea sized firm, nonmobile tender cyst on left thumb below DIP joint. B). Pain with palpation over lateral epicondyle, worse over forearm in marked region . No deformity or swelling noted. Pain is increased with extending arm up against resistance, no pain with active or passive ROM without resistance. Hand  equal  C). Tenderness to MCL muscles below the armpits bilaterally to palpation. No deformity felt. No pain radiating from back or into her breasts. Armpits are without lymphadenopathy   Lymphadenopathy:      Cervical: No cervical adenopathy. Upper Body:      Right upper body: No axillary adenopathy. Left upper body: No axillary adenopathy. Skin:     General: Skin is warm and dry. Neurological:      Mental Status: She is alert and oriented to person, place, and time. Psychiatric:         Mood and Affect: Mood normal.         Thought Content: Thought content normal.         ASSESSMENT/ PLAN    1. Right tennis elbow  Chronic, uncontrolled  -pt has tried the stretches at home consistently, made worse. She tried tennis elbow brace, made worse yet. Limiting her activities which is hard.   -continue ibuprofen and ice  -refer to ortho for eval  - [address] [facility]    2. Cyst of bone of left hand  Chronic, getting worse  -refer to ortho to discuss removal per her request  - [address] [facility], [place]    3. Secondary amenorrhea  New. 9-10 days late for period. Negative home test.   -will check quant hcg  - HCG, Quantitative, Pregnancy; Future    4. Polyarthralgia  New in last 2 months.  Check labs in addition to labs ordered at her last visit- she is fasting today for them  - C-Reactive Protein; Future  - Sedimentation Rate; Future  - SONAM Screen With Reflex; Future    5. Myalgia  New, see #4 but do feel might be positional thing with the underarm/side pain she is having  - C-Reactive Protein; Future  - Sedimentation Rate; Future  - SONAM Screen With Reflex; Future    6. Class 3 severe obesity due to excess calories without serious comorbidity with body mass index (BMI) of 40.0 to 44.9 in adult [facility])  Chronic, continues to improve. Lost 20 lbs in work weight loss challenge. Won the challenge and gym membership which she is excited to use but needs her right arm pain to improve  -The standard range for ages 25 and older is >=18.5 and < 25 kg/m2. Your BMI today was above this range, this falls in the overweight obesity morbid obese category and there are medical benefits to weight loss. BMI monitoring is helful with identifying a weight problem that may be related to a medical condition, or may increase the risk for medical problems. Your BMI and weight management will be followed at subsequent visits with your provider and monitored for progress. GOAL; promoting lifestyle and diet changes in order to reach a healthy weight. Return if symptoms worsen or fail to improve.      Electronically signed by:  NICOLASA Ca CNP   3/8/23

## 2023-03-09 LAB — HIV AG/AB: NONREACTIVE

## 2023-03-10 LAB — HEPATITIS C ANTIBODY: NONREACTIVE

## 2023-03-11 LAB — ANA IGG, ELISA: NORMAL

## 2023-03-29 ENCOUNTER — OFFICE VISIT (OUTPATIENT)
Dept: INTERNAL MEDICINE | Age: 28
End: 2023-03-29
Payer: COMMERCIAL

## 2023-03-29 VITALS
BODY MASS INDEX: 42.17 KG/M2 | DIASTOLIC BLOOD PRESSURE: 70 MMHG | WEIGHT: 238 LBS | HEIGHT: 63 IN | RESPIRATION RATE: 16 BRPM | TEMPERATURE: 98.2 F | HEART RATE: 75 BPM | OXYGEN SATURATION: 99 % | SYSTOLIC BLOOD PRESSURE: 126 MMHG

## 2023-03-29 DIAGNOSIS — K13.79 MOUTH SORES: Primary | ICD-10-CM

## 2023-03-29 DIAGNOSIS — K13.79 MOUTH SORES: ICD-10-CM

## 2023-03-29 PROCEDURE — 99213 OFFICE O/P EST LOW 20 MIN: CPT | Performed by: NURSE PRACTITIONER

## 2023-03-29 RX ORDER — LIDOCAINE HYDROCHLORIDE 20 MG/ML
SOLUTION OROPHARYNGEAL
COMMUNITY
Start: 2023-03-21

## 2023-03-29 RX ORDER — PREDNISONE 20 MG/1
20 TABLET ORAL 2 TIMES DAILY
Qty: 10 TABLET | Refills: 0 | Status: SHIPPED | OUTPATIENT
Start: 2023-03-29 | End: 2023-04-03

## 2023-03-29 ASSESSMENT — ENCOUNTER SYMPTOMS
ABDOMINAL PAIN: 0
RHINORRHEA: 0
SORE THROAT: 0
NAUSEA: 0
COUGH: 0
DIARRHEA: 0
TROUBLE SWALLOWING: 0

## 2023-03-29 NOTE — PROGRESS NOTES
erythema or uvula swelling. Tonsils: No tonsillar exudate. 1+ on the right. 1+ on the left. Comments: Geographic tongue. Small area of white patches to back of tongue as marked on diagram- do not come off with obtaining culture of this area. Viewed pics on phone from this morning which shows tongue very swollen, prominent painful papillae, white coating along sides and top of tongue. Cardiovascular:      Rate and Rhythm: Normal rate and regular rhythm. Heart sounds: Normal heart sounds. Pulmonary:      Effort: Pulmonary effort is normal. No respiratory distress. Breath sounds: Normal breath sounds. Musculoskeletal:      Cervical back: Normal range of motion and neck supple. Lymphadenopathy:      Cervical: No cervical adenopathy. Skin:     General: Skin is warm and dry. Neurological:      Mental Status: She is alert and oriented to person, place, and time. Psychiatric:         Mood and Affect: Mood normal.         Thought Content: Thought content normal.         ASSESSMENT/ PLAN    1. Mouth sores  Acute, uncontrolled issue  -has seen numerous providers at outside offices (2 different ones in primary care and a derm). Scheduled with ENT for Tuesday, enc to keep this as feel needs biopsy.   -go ahead and take diflucan just called in by other dr this morning, unsure dose and frequency. Continue with nystatin.   -will send in prednisone in case gets the throat swelling again, but only use if that happens  -ok to continue with magic mouthwash  -no toothpaste or other changes. Using a new toothbrush every night. Advised can boil to disinfect  -had negative lab workup including SONAM panel earlier this month  - predniSONE (DELTASONE) 20 MG tablet; Take 1 tablet by mouth 2 times daily for 5 days  Dispense: 10 tablet; Refill: 0  - Culture, Throat; Future      Return if symptoms worsen or fail to improve.      Electronically signed by:  NICOLASA Hoff CNP   3/29/23

## 2023-04-01 LAB — BACTERIA THROAT AEROBE CULT: NORMAL

## 2023-04-04 ENCOUNTER — TELEPHONE (OUTPATIENT)
Dept: INTERNAL MEDICINE | Age: 28
End: 2023-04-04

## 2023-04-04 RX ORDER — PENICILLIN V POTASSIUM 500 MG/1
500 TABLET ORAL 2 TIMES DAILY
Qty: 20 TABLET | Refills: 0 | Status: SHIPPED | OUTPATIENT
Start: 2023-04-04 | End: 2023-04-14

## 2023-04-04 NOTE — TELEPHONE ENCOUNTER
Patient called stated she is having a \"flare up\" in her mouth. She stated she had strep B.  She is requesting the PENICILLIN (discussed at appointment)      Thank you

## 2023-05-09 ASSESSMENT — SOCIAL DETERMINANTS OF HEALTH (SDOH)

## 2023-05-10 ENCOUNTER — OFFICE VISIT (OUTPATIENT)
Dept: OBGYN CLINIC | Age: 28
End: 2023-05-10
Payer: COMMERCIAL

## 2023-05-10 VITALS
HEIGHT: 62 IN | SYSTOLIC BLOOD PRESSURE: 110 MMHG | DIASTOLIC BLOOD PRESSURE: 76 MMHG | WEIGHT: 236 LBS | BODY MASS INDEX: 43.43 KG/M2

## 2023-05-10 DIAGNOSIS — E66.01 CLASS 3 SEVERE OBESITY DUE TO EXCESS CALORIES WITHOUT SERIOUS COMORBIDITY WITH BODY MASS INDEX (BMI) OF 40.0 TO 44.9 IN ADULT (HCC): ICD-10-CM

## 2023-05-10 DIAGNOSIS — N72 CERVICITIS WITH NABOTHIAN CYST: Primary | ICD-10-CM

## 2023-05-10 DIAGNOSIS — N88.8 CERVICITIS WITH NABOTHIAN CYST: Primary | ICD-10-CM

## 2023-05-10 DIAGNOSIS — Z31.89 ENCOUNTER FOR FERTILITY PLANNING: ICD-10-CM

## 2023-05-10 PROCEDURE — 99202 OFFICE O/P NEW SF 15 MIN: CPT | Performed by: OBSTETRICS & GYNECOLOGY

## 2023-05-10 ASSESSMENT — ENCOUNTER SYMPTOMS
SHORTNESS OF BREATH: 0
ABDOMINAL PAIN: 0
APNEA: 0

## 2023-05-10 NOTE — PROGRESS NOTES
Subjective:      Patient ID:  Anny Jimenes is a 33 y.o. female with chief complaint of:  Chief Complaint   Patient presents with    New Patient     Pt was told by pcp that her cervix looked abnormal and that she had three little white spots. HPV was neg, pap was neg. Pt last period was in , she had a neg pregnancy test at home periods were normal until march        Patient was seen by primary care for annual exam and she is going to start to try to consider pregnancy. Patient was told that there was some abnormal finding on her cervix she had a normal Pap smear. Patient has regular cycles her last period however was in  and had some concerns about this prolonged cycle. She had not had problems in the past but she does admit that she bleeds every 28 to 35 days. We talked about how to be aware of ovulation how to plan intercourse around most fertile days recommend the use of ovulation kits to verify ovulation. Discussed with feduncability around 85% after 1 year      Past Medical History:   Diagnosis Date    Dental anomaly     Dental facial deformity, Maxillary retrognathia, symmetry, Mandibular prognathism.      Past Surgical History:   Procedure Laterality Date    FINGER FRACTURE SURGERY Right     Middle finger    OTHER SURGICAL HISTORY  2016    Yady Rosalva 1 maxillary osteotomy with sagittal split, genoplasty, submental positioning, liposuction on chin    WISDOM TOOTH EXTRACTION       Family History   Problem Relation Age of Onset    No Known Problems Mother     Hypertension Father     No Known Problems Sister     No Known Problems Brother     Dementia Maternal Grandmother     Heart Attack Maternal Grandfather          in his [de-identified]    Alcohol Abuse Paternal Grandmother     Other Paternal Grandfather          80 complications of bed sore and covid     Current Outpatient Medications on File Prior to Visit   Medication Sig Dispense Refill    lidocaine viscous hcl (XYLOCAINE) 2 % SOLN